# Patient Record
Sex: MALE | Race: WHITE | NOT HISPANIC OR LATINO | URBAN - NONMETROPOLITAN AREA
[De-identification: names, ages, dates, MRNs, and addresses within clinical notes are randomized per-mention and may not be internally consistent; named-entity substitution may affect disease eponyms.]

---

## 2017-03-16 ENCOUNTER — OFFICE VISIT (OUTPATIENT)
Dept: INTERNAL MEDICINE | Facility: CLINIC | Age: 52
End: 2017-03-16

## 2017-03-16 ENCOUNTER — HOSPITAL ENCOUNTER (OUTPATIENT)
Dept: GENERAL RADIOLOGY | Facility: HOSPITAL | Age: 52
Discharge: HOME | End: 2017-03-16
Attending: FAMILY MEDICINE | Admitting: FAMILY MEDICINE

## 2017-03-16 VITALS
HEIGHT: 71 IN | BODY MASS INDEX: 26.99 KG/M2 | TEMPERATURE: 97.9 F | OXYGEN SATURATION: 98 % | HEART RATE: 78 BPM | WEIGHT: 192.8 LBS | SYSTOLIC BLOOD PRESSURE: 108 MMHG | DIASTOLIC BLOOD PRESSURE: 78 MMHG

## 2017-03-16 DIAGNOSIS — M25.521 RIGHT ELBOW PAIN: ICD-10-CM

## 2017-03-16 DIAGNOSIS — N52.9 VASCULOGENIC ERECTILE DYSFUNCTION, UNSPECIFIED VASCULOGENIC ERECTILE DYSFUNCTION TYPE: ICD-10-CM

## 2017-03-16 DIAGNOSIS — R10.13 EPIGASTRIC ABDOMINAL PAIN: ICD-10-CM

## 2017-03-16 DIAGNOSIS — Z11.59 NEED FOR HEPATITIS C SCREENING TEST: ICD-10-CM

## 2017-03-16 DIAGNOSIS — M25.562 CHRONIC PAIN OF BOTH KNEES: Primary | ICD-10-CM

## 2017-03-16 DIAGNOSIS — M79.671 PAIN OF RIGHT HEEL: ICD-10-CM

## 2017-03-16 DIAGNOSIS — F32.1 MODERATE SINGLE CURRENT EPISODE OF MAJOR DEPRESSIVE DISORDER (HCC): ICD-10-CM

## 2017-03-16 DIAGNOSIS — M25.562 CHRONIC PAIN OF BOTH KNEES: ICD-10-CM

## 2017-03-16 DIAGNOSIS — G89.29 CHRONIC PAIN OF BOTH KNEES: Primary | ICD-10-CM

## 2017-03-16 DIAGNOSIS — G89.29 CHRONIC PAIN OF BOTH KNEES: ICD-10-CM

## 2017-03-16 DIAGNOSIS — M25.561 CHRONIC PAIN OF BOTH KNEES: Primary | ICD-10-CM

## 2017-03-16 DIAGNOSIS — M25.561 CHRONIC PAIN OF BOTH KNEES: ICD-10-CM

## 2017-03-16 DIAGNOSIS — R20.0 NUMBNESS AND TINGLING OF BOTH LEGS: ICD-10-CM

## 2017-03-16 DIAGNOSIS — R20.2 NUMBNESS AND TINGLING OF BOTH LEGS: ICD-10-CM

## 2017-03-16 LAB
ALBUMIN SERPL-MCNC: 4.2 G/DL (ref 3.2–4.8)
ALBUMIN/GLOB SERPL: 1.6 G/DL (ref 1.5–2.5)
ALP SERPL-CCNC: 81 U/L (ref 25–100)
ALT SERPL W P-5'-P-CCNC: 18 U/L (ref 7–40)
ANION GAP SERPL CALCULATED.3IONS-SCNC: 8 MMOL/L (ref 3–11)
ARTICHOKE IGE QN: 125 MG/DL (ref 0–130)
AST SERPL-CCNC: 23 U/L (ref 0–33)
BASOPHILS # BLD AUTO: 0.02 10*3/MM3 (ref 0–0.2)
BASOPHILS NFR BLD AUTO: 0.2 % (ref 0–1)
BILIRUB SERPL-MCNC: 0.5 MG/DL (ref 0.3–1.2)
BUN BLD-MCNC: 11 MG/DL (ref 9–23)
BUN/CREAT SERPL: 11 (ref 7–25)
CALCIUM SPEC-SCNC: 9.9 MG/DL (ref 8.7–10.4)
CHLORIDE SERPL-SCNC: 105 MMOL/L (ref 99–109)
CHOLEST SERPL-MCNC: 208 MG/DL (ref 0–200)
CO2 SERPL-SCNC: 28 MMOL/L (ref 20–31)
CREAT BLD-MCNC: 1 MG/DL (ref 0.6–1.3)
DEPRECATED RDW RBC AUTO: 47.6 FL (ref 37–54)
EOSINOPHIL # BLD AUTO: 0.23 10*3/MM3 (ref 0.1–0.3)
EOSINOPHIL NFR BLD AUTO: 2.7 % (ref 0–3)
ERYTHROCYTE [DISTWIDTH] IN BLOOD BY AUTOMATED COUNT: 14 % (ref 11.3–14.5)
FOLATE SERPL-MCNC: 5.97 NG/ML (ref 3.2–20)
GFR SERPL CREATININE-BSD FRML MDRD: 78 ML/MIN/1.73
GLOBULIN UR ELPH-MCNC: 2.7 GM/DL
GLUCOSE BLD-MCNC: 78 MG/DL (ref 70–100)
HCT VFR BLD AUTO: 52.2 % (ref 38.9–50.9)
HCV AB SER DONR QL: NORMAL
HDLC SERPL-MCNC: 39 MG/DL (ref 40–60)
HGB BLD-MCNC: 17.3 G/DL (ref 13.1–17.5)
IMM GRANULOCYTES # BLD: 0.01 10*3/MM3 (ref 0–0.03)
IMM GRANULOCYTES NFR BLD: 0.1 % (ref 0–0.6)
LYMPHOCYTES # BLD AUTO: 1.85 10*3/MM3 (ref 0.6–4.8)
LYMPHOCYTES NFR BLD AUTO: 21.8 % (ref 24–44)
MCH RBC QN AUTO: 30.7 PG (ref 27–31)
MCHC RBC AUTO-ENTMCNC: 33.1 G/DL (ref 32–36)
MCV RBC AUTO: 92.6 FL (ref 80–99)
MONOCYTES # BLD AUTO: 0.44 10*3/MM3 (ref 0–1)
MONOCYTES NFR BLD AUTO: 5.2 % (ref 0–12)
NEUTROPHILS # BLD AUTO: 5.94 10*3/MM3 (ref 1.5–8.3)
NEUTROPHILS NFR BLD AUTO: 70 % (ref 41–71)
PLATELET # BLD AUTO: 111 10*3/MM3 (ref 150–450)
PMV BLD AUTO: 11.8 FL (ref 6–12)
POTASSIUM BLD-SCNC: 4.1 MMOL/L (ref 3.5–5.5)
PROT SERPL-MCNC: 6.9 G/DL (ref 5.7–8.2)
RBC # BLD AUTO: 5.64 10*6/MM3 (ref 4.2–5.76)
SODIUM BLD-SCNC: 141 MMOL/L (ref 132–146)
TRIGL SERPL-MCNC: 424 MG/DL (ref 0–150)
VIT B12 BLD-MCNC: 286 PG/ML (ref 211–911)
WBC NRBC COR # BLD: 8.49 10*3/MM3 (ref 3.5–10.8)

## 2017-03-16 PROCEDURE — 85025 COMPLETE CBC W/AUTO DIFF WBC: CPT | Performed by: FAMILY MEDICINE

## 2017-03-16 PROCEDURE — 80053 COMPREHEN METABOLIC PANEL: CPT | Performed by: FAMILY MEDICINE

## 2017-03-16 PROCEDURE — 83516 IMMUNOASSAY NONANTIBODY: CPT | Performed by: FAMILY MEDICINE

## 2017-03-16 PROCEDURE — 36415 COLL VENOUS BLD VENIPUNCTURE: CPT | Performed by: FAMILY MEDICINE

## 2017-03-16 PROCEDURE — 73562 X-RAY EXAM OF KNEE 3: CPT

## 2017-03-16 PROCEDURE — 96127 BRIEF EMOTIONAL/BEHAV ASSMT: CPT | Performed by: FAMILY MEDICINE

## 2017-03-16 PROCEDURE — 82746 ASSAY OF FOLIC ACID SERUM: CPT | Performed by: FAMILY MEDICINE

## 2017-03-16 PROCEDURE — 80061 LIPID PANEL: CPT | Performed by: FAMILY MEDICINE

## 2017-03-16 PROCEDURE — 99204 OFFICE O/P NEW MOD 45 MIN: CPT | Performed by: FAMILY MEDICINE

## 2017-03-16 PROCEDURE — 82607 VITAMIN B-12: CPT | Performed by: FAMILY MEDICINE

## 2017-03-16 PROCEDURE — 86803 HEPATITIS C AB TEST: CPT | Performed by: FAMILY MEDICINE

## 2017-03-16 RX ORDER — SILDENAFIL 25 MG/1
25 TABLET, FILM COATED ORAL DAILY PRN
Qty: 15 TABLET | Refills: 2 | Status: SHIPPED | OUTPATIENT
Start: 2017-03-16 | End: 2017-11-01

## 2017-03-16 RX ORDER — METHYLPREDNISOLONE 4 MG/1
TABLET ORAL
Qty: 1 EACH | Refills: 0 | Status: SHIPPED | OUTPATIENT
Start: 2017-03-16 | End: 2017-11-01

## 2017-03-16 RX ORDER — MELOXICAM 15 MG/1
15 TABLET ORAL DAILY
Qty: 30 TABLET | Refills: 2 | Status: SHIPPED | OUTPATIENT
Start: 2017-03-16 | End: 2017-11-01

## 2017-03-16 NOTE — PROGRESS NOTES
"Subjective   Jaxon High is a 51 y.o. male.     History of Present Illness   Elbow pain for a couple of months that's worsening. Right pain. He's tried not using it but it has not helped.  No known injuries.    Also having knee pain. Was told he needed a replacement. If he stands on feet too long he feels a needle sensation in his thighs.  A family member took hydrocodone and seemed to help with their joint pain.    Has pain in right heel. Tender and hurts to stand on. Squeezes and it hurts. No pain in rest of foot. No injury. Has been there 1-2 months at least. Mom had similar symptoms. No pain on left.  Pain does not improve with continued walking.    Had cholecystectomy in 2009. Has occasional pain on epigastrum, feels like a knife twisting. Relieved by passing gas or burping. Does not get a lot of heartburn, used to. Wakes up with bad taste in mouth. Pain is worse when he drinks liquor, has \"crying pain.\"     Has had a 40% lung collapse on right from fluid build up, not given diagnosis for that. Happened around 2010.     Also having difficulty with mood.  No suicidal thoughts at this time.  Does not feel medication is needed.  He feels most of his mood issues are due to his chronic pain.    PHQ-9 Depression Screening 3/16/2017   Little interest or pleasure in doing things 2   Feeling down, depressed, or hopeless 2   Trouble falling or staying asleep, or sleeping too much 2   Feeling tired or having little energy 2   Poor appetite or overeating 2   Feeling bad about yourself - or that you are a failure or have let yourself or your family down 3   Trouble concentrating on things, such as reading the newspaper or watching television 2   Moving or speaking so slowly that other people could have noticed. Or the opposite - being so fidgety or restless that you have been moving around a lot more than usual 2   Thoughts that you would be better off dead, or of hurting yourself in some way 2   PHQ-9 Total Score 19   If " you checked off any problems, how difficult have these problems made it for you to do your work, take care of things at home, or get along with other people? Very difficult       The following portions of the patient's history were reviewed and updated as appropriate: allergies, current medications, past family history, past medical history, past social history, past surgical history and problem list.    Review of Systems   Constitutional:        Feeling poorly, tired, weight gain.   Respiratory:        Wheezing, cough, shortness of breath with exertion.   Cardiovascular: Negative for chest pain.        Leg cramps   Genitourinary: Negative for difficulty urinating, frequency and urgency.        No nocturia   Musculoskeletal: Positive for arthralgias and myalgias.        Chronic pain   Neurological:        Headache, numbness, tingling   Psychiatric/Behavioral: Positive for dysphoric mood. Negative for suicidal ideas.        Sadness   All other systems reviewed and are negative.      Objective   Physical Exam   Constitutional: He is oriented to person, place, and time. Vital signs are normal. He appears well-developed and well-nourished. He is active. He does not appear ill.   HENT:   Head: Normocephalic and atraumatic. Hair is normal.   Right Ear: Hearing normal.   Left Ear: Hearing normal.   Nose: Nose normal.   Mouth/Throat: Mucous membranes are not dry. Abnormal dentition.   Eyes: EOM and lids are normal. Pupils are equal, round, and reactive to light. No scleral icterus.   Neck: Phonation normal.   Cardiovascular: Normal rate, regular rhythm and normal heart sounds.    Pulmonary/Chest: Effort normal and breath sounds normal.   Abdominal: Soft. Bowel sounds are normal. He exhibits no distension, no fluid wave and no mass. There is no hepatosplenomegaly. There is tenderness in the epigastric area.   Musculoskeletal:   Severe crepitus to knees bilaterally. Negative for lateral or medial joint line tenderness.  Tenderness along lateral epicondyle on right elbow. Normal flexion/extension elbow.    Neurological: He is alert and oriented to person, place, and time. He displays no tremor. No cranial nerve deficit. Gait normal.   Skin: Skin is warm. He is not diaphoretic. No cyanosis. Nails show no clubbing.   Psychiatric: He has a normal mood and affect. His speech is normal and behavior is normal. Judgment and thought content normal.   Nursing note and vitals reviewed.    X-ray from today's visit reviewed that read is pending.  I do not appreciate any joint pathology.  No bone lesions noted.    Assessment/Plan   Jaxon was seen today for establish care, knee pain, abdominal pain and foot pain.    Diagnoses and all orders for this visit:    Chronic pain of both knees  -     XR Knee 3 View Bilateral; Future  -     meloxicam (MOBIC) 15 MG tablet; Take 1 tablet by mouth Daily.  -     Celiac Comprehensive Panel  -     Helicobacter Pylori, IgA IgG IgM    Epigastric abdominal pain  -     CBC & Differential; Future  -     Comprehensive Metabolic Panel; Future  -     Lipid Panel; Future  -     Cancel: Celiac Comprehensive Panel  -     Cancel: Helicobacter Pylori, IgA IgG IgM  -     Celiac Comprehensive Panel  -     Helicobacter Pylori, IgA IgG IgM  -     CBC & Differential  -     Comprehensive Metabolic Panel  -     Lipid Panel  -     CBC Auto Differential    Numbness and tingling of both legs  -     Vitamin B12 & Folate; Future  -     Celiac Comprehensive Panel  -     Helicobacter Pylori, IgA IgG IgM  -     Vitamin B12 & Folate    Pain of right heel  -     meloxicam (MOBIC) 15 MG tablet; Take 1 tablet by mouth Daily.  -     Celiac Comprehensive Panel  -     Helicobacter Pylori, IgA IgG IgM    Right elbow pain  -     meloxicam (MOBIC) 15 MG tablet; Take 1 tablet by mouth Daily.  -     MethylPREDNISolone (MEDROL, ISABELLA,) 4 MG tablet; Take as directed on package instructions.  -     Celiac Comprehensive Panel  -     Helicobacter Pylori,  IgA IgG IgM    Vasculogenic erectile dysfunction, unspecified vasculogenic erectile dysfunction type  -     sildenafil (VIAGRA) 25 MG tablet; Take 1 tablet by mouth Daily As Needed for erectile dysfunction.    Moderate single current episode of major depressive disorder    Need for hepatitis C screening test  -     Hepatitis C Antibody; Future  -     Celiac Comprehensive Panel  -     Helicobacter Pylori, IgA IgG IgM  -     Hepatitis C Antibody     need past records.  Patient in today with a lot of pain complaints.  Encouraged use of anti-inflammatory's to help with this to get at the root of pain.  Discouraged use of narcotics.  Mentioned possible meralgia paresthetica but patient wears loosefitting pants.  I have asked him to return in about a month to follow-up on his many issues.  We'll recheck mood at that time.  Told patient that if he gets an erection last more than 4 hours he needs to go to the ER.

## 2017-03-17 LAB
ENDOMYSIUM IGA SER QL: NEGATIVE
GLIADIN PEPTIDE IGA SER-ACNC: 7 UNITS (ref 0–19)
GLIADIN PEPTIDE IGG SER-ACNC: 2 UNITS (ref 0–19)
IGA SERPL-MCNC: 206 MG/DL (ref 90–386)
TTG IGA SER-ACNC: <2 U/ML (ref 0–3)
TTG IGG SER-ACNC: 4 U/ML (ref 0–5)

## 2017-03-20 LAB
H PYLORI IGA SER IA-ACNC: 9.5 UNITS (ref 0–8.9)
H PYLORI IGG SER IA-ACNC: 5.6 U/ML (ref 0–0.8)
H PYLORI IGM SER-ACNC: <9 UNITS (ref 0–8.9)

## 2017-03-20 RX ORDER — AMOXICILLIN 500 MG/1
1000 CAPSULE ORAL 2 TIMES DAILY
Qty: 56 CAPSULE | Refills: 0 | Status: SHIPPED | OUTPATIENT
Start: 2017-03-20 | End: 2017-04-03

## 2017-03-20 RX ORDER — OMEPRAZOLE 20 MG/1
20 CAPSULE, DELAYED RELEASE ORAL 2 TIMES DAILY
Qty: 28 CAPSULE | Refills: 0 | Status: SHIPPED | OUTPATIENT
Start: 2017-03-20 | End: 2017-04-03

## 2017-03-20 RX ORDER — CLARITHROMYCIN 500 MG/1
500 TABLET, COATED ORAL 2 TIMES DAILY
Qty: 28 TABLET | Refills: 0 | Status: SHIPPED | OUTPATIENT
Start: 2017-03-20 | End: 2017-11-01

## 2017-03-22 ENCOUNTER — TELEPHONE (OUTPATIENT)
Dept: INTERNAL MEDICINE | Facility: CLINIC | Age: 52
End: 2017-03-22

## 2017-03-22 NOTE — TELEPHONE ENCOUNTER
MR. PATEL STOPPED IN THE OFFICE TO GET SAMPLES OF VIAGRA. WE ARE OUT OF SAMPLES. I ASK HIM IF HE WAS ABLE TO GET HIS MEDICATION FROM THE PHARMACY, HE SAID THAT INSURANCE WILL NOT PAY AND EACH PILL COST $59/EACH. I TOLD HIM I WOULD TRY TO SAVE HIM SOME SAMPLES WHEN WE GET MORE.

## 2017-11-01 ENCOUNTER — OFFICE VISIT (OUTPATIENT)
Dept: INTERNAL MEDICINE | Facility: CLINIC | Age: 52
End: 2017-11-01

## 2017-11-01 VITALS
RESPIRATION RATE: 14 BRPM | DIASTOLIC BLOOD PRESSURE: 72 MMHG | OXYGEN SATURATION: 96 % | TEMPERATURE: 98.2 F | SYSTOLIC BLOOD PRESSURE: 120 MMHG | WEIGHT: 198 LBS | HEART RATE: 82 BPM | HEIGHT: 71 IN | BODY MASS INDEX: 27.72 KG/M2

## 2017-11-01 DIAGNOSIS — R11.10 INTERMITTENT VOMITING: ICD-10-CM

## 2017-11-01 DIAGNOSIS — M25.521 RIGHT ELBOW PAIN: ICD-10-CM

## 2017-11-01 DIAGNOSIS — R10.13 EPIGASTRIC ABDOMINAL PAIN: ICD-10-CM

## 2017-11-01 DIAGNOSIS — M54.2 NECK PAIN: Primary | ICD-10-CM

## 2017-11-01 PROCEDURE — 99214 OFFICE O/P EST MOD 30 MIN: CPT | Performed by: FAMILY MEDICINE

## 2017-11-01 RX ORDER — OMEPRAZOLE 20 MG/1
20 CAPSULE, DELAYED RELEASE ORAL DAILY
Qty: 30 CAPSULE | Refills: 1 | Status: SHIPPED | OUTPATIENT
Start: 2017-11-01 | End: 2018-03-07 | Stop reason: SDUPTHER

## 2017-11-01 RX ORDER — TIZANIDINE HYDROCHLORIDE 4 MG/1
4 CAPSULE, GELATIN COATED ORAL 3 TIMES DAILY PRN
Qty: 90 CAPSULE | Refills: 1 | Status: SHIPPED | OUTPATIENT
Start: 2017-11-01

## 2017-11-01 NOTE — PROGRESS NOTES
Subjective    Jaxon High is a 52 y.o. male here for:  Chief Complaint   Patient presents with   • Heartburn     after eating   • Pain     pain in back of head radiates down into neck   • Elbow Problem     inflamation in elbow     History of Present Illness   Patient says he can eat and about 5 min after he's burping everything up. He says food is coming up with hot liquid. He's had this for about a week. No constipation at this time. No black color to bowel movement nor blood. He's not had this issue before. No heartburn prior to this. Had gallbladder out a couple of years ago. Not eating any different food. He has some epigastric ab pain as well as rlq ab pain.     He is again having right elbow pain. He previously had an injection in the elbow that helped. It's the same thing as last time.     Has a constant pain on back of head, 24 hours. If he turns his head pain shoots from shoulder up neck to head. He's had this for a week or week and a half. Seemed to start around same time as reflux issued. One time before he had pain in head and he got a shot in the hospital. May have been tension, could not move head and had shoulder pain. No vision changes. Almost feels like he's buzzed like drinking but he's not drinking. No injuries.     The following portions of the patient's history were reviewed and updated as appropriate: allergies, current medications, past family history, past medical history, past social history, past surgical history and problem list.    Review of Systems   Constitutional: Negative for activity change.   Gastrointestinal: Positive for abdominal pain. Negative for anal bleeding and constipation.   Musculoskeletal: Positive for arthralgias.   Neurological: Positive for headaches. Negative for syncope.       Vitals:    11/01/17 1447   BP: 120/72   Pulse: 82   Resp: 14   Temp: 98.2 °F (36.8 °C)   SpO2: 96%       Objective   Physical Exam   Constitutional: He is oriented to person, place, and  time. Vital signs are normal. He appears well-developed and well-nourished. He is active. He does not have a sickly appearance. He does not appear ill.   Appears stated age. Well groomed. overweight.   HENT:   Head: Normocephalic and atraumatic. Head is without contusion. Hair is normal.   Right Ear: Hearing normal.   Left Ear: Hearing normal.   Nose: Nose normal.   Mouth/Throat: Mucous membranes are not dry.   Eyes: EOM and lids are normal. Pupils are equal, round, and reactive to light. No scleral icterus.   Neck: Normal range of motion. Neck supple. Muscular tenderness present. No rigidity.   Cardiovascular: Normal rate, regular rhythm and normal heart sounds.  Exam reveals no gallop and no friction rub.    No murmur heard.  Pulmonary/Chest: Effort normal and breath sounds normal.   Abdominal: Soft. Bowel sounds are normal. He exhibits no distension. There is no hepatosplenomegaly. There is tenderness in the epigastric area. There is no rigidity, no rebound and no guarding.   Musculoskeletal: He exhibits no deformity.        Right elbow: He exhibits normal range of motion, no swelling, no effusion, no deformity and no laceration.        Cervical back: He exhibits no deformity.        Right upper arm: He exhibits no tenderness, no swelling and no deformity.        Right forearm: He exhibits no tenderness, no swelling and no deformity.   Neurological: He is alert and oriented to person, place, and time. He displays no tremor. No cranial nerve deficit. Gait normal.   Skin: Skin is warm. He is not diaphoretic. No cyanosis. Nails show no clubbing.   Psychiatric: He has a normal mood and affect. His speech is normal and behavior is normal. Judgment and thought content normal. Cognition and memory are normal.   Nursing note and vitals reviewed.      Assessment/Plan   Jaxon was seen today for heartburn, pain and elbow problem.    Diagnoses and all orders for this visit:    Neck pain  -     TiZANidine (ZANAFLEX) 4 MG  capsule; Take 1 capsule by mouth 3 (Three) Times a Day As Needed for Muscle Spasms.    Epigastric abdominal pain  -     omeprazole (priLOSEC) 20 MG capsule; Take 1 capsule by mouth Daily.    Intermittent vomiting  -     omeprazole (priLOSEC) 20 MG capsule; Take 1 capsule by mouth Daily.    Right elbow pain      · Trial of PPI. Discussed use, use for 2 weeks then stop and see if issue returns. May need EGD  · Suspect muscle pain leading to headache he's describing. No red flags to warrant head imaging at this time but may if symptoms do not improve  · Needs to call ortho for elbow. Has been seen within year. Phone number provided.            May Merino MD

## 2017-11-22 ENCOUNTER — OFFICE VISIT (OUTPATIENT)
Dept: INTERNAL MEDICINE | Facility: CLINIC | Age: 52
End: 2017-11-22

## 2017-11-22 VITALS
OXYGEN SATURATION: 95 % | HEART RATE: 80 BPM | BODY MASS INDEX: 27.72 KG/M2 | DIASTOLIC BLOOD PRESSURE: 62 MMHG | HEIGHT: 71 IN | TEMPERATURE: 97.6 F | WEIGHT: 198 LBS | SYSTOLIC BLOOD PRESSURE: 118 MMHG | RESPIRATION RATE: 12 BRPM

## 2017-11-22 DIAGNOSIS — Z20.2 HPV EXPOSURE: Primary | ICD-10-CM

## 2017-11-22 PROCEDURE — 99212 OFFICE O/P EST SF 10 MIN: CPT | Performed by: FAMILY MEDICINE

## 2017-11-23 NOTE — PROGRESS NOTES
Subjective    Jaxon High is a 52 y.o. male here for:  Chief Complaint   Patient presents with   • Follow-up     Wife recently diagnosed with HPV, requesting labs     History of Present Illness     Wife recently had pap smear that was normal but hpv 18 positive. Patient wants to be screened for hpv.     The following portions of the patient's history were reviewed and updated as appropriate: allergies, current medications, past family history, past medical history, past social history, past surgical history and problem list.    Review of Systems   Constitutional: Negative for activity change.       Vitals:    11/22/17 1519   BP: 118/62   Pulse: 80   Resp: 12   Temp: 97.6 °F (36.4 °C)   SpO2: 95%       Objective   Physical Exam   Constitutional: He is oriented to person, place, and time. Vital signs are normal. He appears well-developed and well-nourished.  Non-toxic appearance. He does not appear ill. No distress.   Appears stated age. Well groomed.   HENT:   Head: Normocephalic and atraumatic.   Eyes: EOM are normal. No scleral icterus.   Pulmonary/Chest: Effort normal.   Neurological: He is alert and oriented to person, place, and time.   Skin: Skin is warm. He is not diaphoretic.   Psychiatric: He has a normal mood and affect. His speech is normal and behavior is normal. Cognition and memory are normal.   Nursing note and vitals reviewed.      Assessment/Plan   Jaxon was seen today for follow-up.    Diagnoses and all orders for this visit:    HPV exposure      · Information provided from CDC. No screenings recommended for men. Suggested smoking cessation.         May Merino MD

## 2018-02-08 ENCOUNTER — OFFICE VISIT (OUTPATIENT)
Dept: INTERNAL MEDICINE | Facility: CLINIC | Age: 53
End: 2018-02-08

## 2018-02-08 VITALS
RESPIRATION RATE: 16 BRPM | OXYGEN SATURATION: 97 % | BODY MASS INDEX: 28.45 KG/M2 | DIASTOLIC BLOOD PRESSURE: 72 MMHG | HEART RATE: 72 BPM | HEIGHT: 71 IN | TEMPERATURE: 97.6 F | SYSTOLIC BLOOD PRESSURE: 106 MMHG | WEIGHT: 203.19 LBS

## 2018-02-08 DIAGNOSIS — R05.8 COUGH PRESENT FOR GREATER THAN 3 WEEKS: Primary | ICD-10-CM

## 2018-02-08 DIAGNOSIS — R06.2 WHEEZING: ICD-10-CM

## 2018-02-08 DIAGNOSIS — M72.2 PLANTAR FASCIITIS, LEFT: ICD-10-CM

## 2018-02-08 DIAGNOSIS — Z72.0 TOBACCO ABUSE: ICD-10-CM

## 2018-02-08 PROCEDURE — 99214 OFFICE O/P EST MOD 30 MIN: CPT | Performed by: NURSE PRACTITIONER

## 2018-02-08 PROCEDURE — 99406 BEHAV CHNG SMOKING 3-10 MIN: CPT | Performed by: NURSE PRACTITIONER

## 2018-02-08 RX ORDER — NICOTINE 21 MG/24HR
1 PATCH, TRANSDERMAL 24 HOURS TRANSDERMAL EVERY 24 HOURS
Qty: 21 PATCH | Refills: 1 | Status: SHIPPED | OUTPATIENT
Start: 2018-02-08 | End: 2018-03-07

## 2018-02-08 RX ORDER — AZELASTINE 1 MG/ML
2 SPRAY, METERED NASAL DAILY
Qty: 30 ML | Refills: 2 | Status: SHIPPED | OUTPATIENT
Start: 2018-02-08

## 2018-02-08 RX ORDER — ALBUTEROL SULFATE 90 UG/1
2 AEROSOL, METERED RESPIRATORY (INHALATION) EVERY 4 HOURS PRN
Qty: 1 INHALER | Refills: 4 | Status: SHIPPED | OUTPATIENT
Start: 2018-02-08

## 2018-02-08 NOTE — PROGRESS NOTES
Chief Complaint / Reason:      Chief Complaint   Patient presents with   • Foot Pain     left heel pain   • Cough     voice sounds different, onset about 2 mos. ago, congested.        Subjective     HPI  Patient presents today with complaints of cough onset about 2 months ago and states that he has been congested and his voice sounds different.  He is concerned as he has a family history of throat cancer.  He isn't every day smoker and states that he did smoke a pack per day and cut down to a half a pack and then to 8 cigarettes and has only had 4 cigarettes today.  He stated he would like to quit completely but it is very difficult.  He also drinks whiskey every 2-3 days he states he only has 1 shot of Yonatan Bhavesh.  Patient denies chest pain shortness of breath or heart palpitations.  He states that he does cough frequently and has to clear his throat.  His cough is not always productive nor has he had a fever or chills.  Patient does work daily and does a lot of standing for 11 hours a day and complaint of left heel pain.  He states he is left-hand dominant.  He puts more weight on his left foot and he stands on concrete floors.  He states it feels like something is being stabbed into his heel.  Denies numbness or tingling.  He states this is been going on for over a month now and has worsened.  He states symptoms are worse in the morning and evening.  History taken from: patient    PMH/FH/Social History were reviewed and updated appropriately in the electronic medical record.     Review of Systems:   Review of Systems   Constitutional: Negative.    HENT: Positive for congestion.    Respiratory: Positive for cough and wheezing.    Cardiovascular: Negative.    Gastrointestinal: Negative.    Musculoskeletal: Positive for arthralgias, gait problem and myalgias.     All other systems were reviewed and are negative.  Exceptions are noted in the subjective or above.      Objective     Vital Signs  Vitals:     02/08/18 1442   BP: 106/72   Pulse: 72   Resp: 16   Temp: 97.6 °F (36.4 °C)   SpO2: 97%       Body mass index is 28.34 kg/(m^2).    Physical Exam   Constitutional: He is oriented to person, place, and time. He appears well-developed and well-nourished.   Cardiovascular: Normal rate, regular rhythm, normal heart sounds and intact distal pulses.    Pulmonary/Chest: Effort normal. He has wheezes. He exhibits no tenderness.   Abdominal: Soft. Bowel sounds are normal.   Musculoskeletal: Normal range of motion. He exhibits tenderness.        Left foot: There is bony tenderness. There is normal range of motion and normal capillary refill.   Patient's arch is less when standing and puts more weight on left foot.Shoes appear to be worn more on heel region of shoe.         Neurological: He is alert and oriented to person, place, and time.   Skin: Skin is warm and dry.   Psychiatric: He has a normal mood and affect. His behavior is normal. Judgment and thought content normal.   Nursing note and vitals reviewed.        Medication Review:     Current Outpatient Prescriptions:   •  omeprazole (priLOSEC) 20 MG capsule, Take 1 capsule by mouth Daily., Disp: 30 capsule, Rfl: 1  •  TiZANidine (ZANAFLEX) 4 MG capsule, Take 1 capsule by mouth 3 (Three) Times a Day As Needed for Muscle Spasms., Disp: 90 capsule, Rfl: 1  •  albuterol (PROVENTIL HFA;VENTOLIN HFA) 108 (90 Base) MCG/ACT inhaler, Inhale 2 puffs Every 4 (Four) Hours As Needed for Wheezing or Shortness of Air., Disp: 1 inhaler, Rfl: 4  •  azelastine (ASTELIN) 0.1 % nasal spray, 2 sprays into each nostril Daily. Use in each nostril as directed, Disp: 30 mL, Rfl: 2  •  nicotine (NICODERM CQ) 14 MG/24HR patch, Place 1 patch on the skin Daily., Disp: 21 patch, Rfl: 1    Assessment/Plan   Jaxon was seen today for foot pain and cough.    Diagnoses and all orders for this visit:    Cough present for greater than 3 weeks  -     azelastine (ASTELIN) 0.1 % nasal spray; 2 sprays into  each nostril Daily. Use in each nostril as directed  Increase Water intake, elevate head of bed at night, and smoking cessation.  Advised patient to take over-the-counter Zyrtec  If symptoms persist recommend chest x-ray PA and lateral  Wheezing  -     albuterol (PROVENTIL HFA;VENTOLIN HFA) 108 (90 Base) MCG/ACT inhaler; Inhale 2 puffs Every 4 (Four) Hours As Needed for Wheezing or Shortness of Air.  Recommend coughing and deep breathing along with pulmonary hygiene.  Tobacco abuse  -     nicotine (NICODERM CQ) 14 MG/24HR patch; Place 1 patch on the skin Daily.  I advised the patient of the risks in continuing to use tobacco, and I provided this patient with smoking cessation educational materials.  I also discussed how to quit smoking and the patient has expressed the willingness to quit.      During this visit, I spent 3-10 mintues counseling the patient regarding smoking cessation.       Plantar fasciitis, left  Recommend patient avoid sitting or standing for long periods of time.  Recommend night splint and exercises to stretch. .  Advised patient to rest ice and over-the-counter pain medicines to manage pain.  Recommend patient use tennis ball or water bottle and roll under good.  Recommend he follow up if symptoms do not improve.  May need imaging to rule out heel spur.   Advised patient to get comfortable shoes and possible heel inserts     Return in about 4 weeks (around 3/8/2018), or if symptoms worsen or fail to improve.    Anayeli Neal, APRN  02/08/2018

## 2018-03-07 ENCOUNTER — OFFICE VISIT (OUTPATIENT)
Dept: INTERNAL MEDICINE | Facility: CLINIC | Age: 53
End: 2018-03-07

## 2018-03-07 VITALS — SYSTOLIC BLOOD PRESSURE: 124 MMHG | DIASTOLIC BLOOD PRESSURE: 84 MMHG

## 2018-03-07 DIAGNOSIS — R49.9 CHANGE OF VOICE: Primary | ICD-10-CM

## 2018-03-07 DIAGNOSIS — Z72.0 TOBACCO ABUSE: ICD-10-CM

## 2018-03-07 DIAGNOSIS — Z80.0 FAMILY HISTORY OF THROAT CANCER: ICD-10-CM

## 2018-03-07 PROCEDURE — 99213 OFFICE O/P EST LOW 20 MIN: CPT | Performed by: FAMILY MEDICINE

## 2018-03-09 NOTE — PROGRESS NOTES
Subjective    Jaxon High is a 52 y.o. male here for:  No chief complaint on file.    History of Present Illness     He was seen 2/8/18 by TRELL Neal for cough, wheezing, and voice changes. He reports cough has improved but despite Rx for Astelin voice still does not sound right. He smokes and there's a family history of cancer in the throats. He is interested in further evaluation by ENT.    The following portions of the patient's history were reviewed and updated as appropriate: allergies, current medications, past family history, past medical history, past social history, past surgical history and problem list.    Review of Systems   Constitutional: Negative for appetite change.   Respiratory: Negative for shortness of breath.        Vitals:    03/07/18 1121   BP: 124/84     (vitals were taken when patient was on Anayeli's schedule by mistake, I've reviewed them).      Objective   Physical Exam   Constitutional: He is oriented to person, place, and time. Vital signs are normal. He appears well-developed and well-nourished.  Non-toxic appearance. He does not appear ill. No distress.   Appears stated age. Well groomed.   HENT:   Head: Normocephalic and atraumatic.   Eyes: EOM are normal. No scleral icterus.   Neck:   A bit hoarse in voice   Pulmonary/Chest: Effort normal.   Neurological: He is alert and oriented to person, place, and time.   Skin: Skin is warm. He is not diaphoretic.   Psychiatric: He has a normal mood and affect. His speech is normal and behavior is normal. Cognition and memory are normal.   Nursing note and vitals reviewed.          Assessment/Plan       Diagnoses and all orders for this visit:    Change of voice  -     Ambulatory Referral to ENT (Otolaryngology)    Tobacco abuse  -     Ambulatory Referral to ENT (Otolaryngology)    Family history of throat cancer  -     Ambulatory Referral to ENT (Otolaryngology)        ·     Return for As Needed.    May Merino MD    Please note  that portions of this note may have been completed with a voice recognition program. Efforts were made to edit dictation, but occasionally words are mistranscribed.

## 2018-08-27 ENCOUNTER — OFFICE VISIT (OUTPATIENT)
Dept: INTERNAL MEDICINE | Facility: CLINIC | Age: 53
End: 2018-08-27

## 2018-08-27 VITALS
WEIGHT: 199.75 LBS | BODY MASS INDEX: 27.97 KG/M2 | RESPIRATION RATE: 16 BRPM | OXYGEN SATURATION: 94 % | HEART RATE: 76 BPM | TEMPERATURE: 98.9 F | SYSTOLIC BLOOD PRESSURE: 112 MMHG | DIASTOLIC BLOOD PRESSURE: 74 MMHG | HEIGHT: 71 IN

## 2018-08-27 DIAGNOSIS — K21.9 GASTROESOPHAGEAL REFLUX DISEASE, ESOPHAGITIS PRESENCE NOT SPECIFIED: ICD-10-CM

## 2018-08-27 DIAGNOSIS — N52.9 VASCULOGENIC ERECTILE DYSFUNCTION, UNSPECIFIED VASCULOGENIC ERECTILE DYSFUNCTION TYPE: Primary | ICD-10-CM

## 2018-08-27 DIAGNOSIS — R05.8 COUGH PRESENT FOR GREATER THAN 3 WEEKS: ICD-10-CM

## 2018-08-27 DIAGNOSIS — Z72.0 TOBACCO ABUSE: ICD-10-CM

## 2018-08-27 PROCEDURE — 99214 OFFICE O/P EST MOD 30 MIN: CPT | Performed by: NURSE PRACTITIONER

## 2018-08-27 RX ORDER — TADALAFIL 10 MG/1
10 TABLET ORAL DAILY PRN
Qty: 5 TABLET | Refills: 1 | Status: SHIPPED | OUTPATIENT
Start: 2018-08-27 | End: 2018-09-06

## 2018-08-27 RX ORDER — OMEPRAZOLE 40 MG/1
40 CAPSULE, DELAYED RELEASE ORAL DAILY
Qty: 30 CAPSULE | Refills: 1 | Status: SHIPPED | OUTPATIENT
Start: 2018-08-27

## 2018-09-04 PROBLEM — K21.9 GERD (GASTROESOPHAGEAL REFLUX DISEASE): Status: ACTIVE | Noted: 2018-09-04

## 2018-09-04 NOTE — PROGRESS NOTES
Chief Complaint / Reason:      Chief Complaint   Patient presents with   • Cough     followup, asked for Viagra samples, would like to increase omeprazole rx to 40 mg as that has been more effective.        Subjective     HPI  Patient presents today for follow-up regarding cough that has been going on for greater then a month he states that it is not as bad that he feels like the omeprazole seems to be working but he would like to increase the dosage to 40 mg as it has been more effective.  He also is requesting Viagra samples for erectile dysfunction.  Vital signs are stable.  He denies chest pain, shortness of breath or heart palpitations.  He denies any weight loss night sweats or bloody sputum. Denies problems swallowing.  Patient does have a history of tobacco usage and is a current every day smoker.  He has not had a chest x-ray.  Oxygen saturation is 94%.  Patient does have a family history of throat cancer and strongly advised patient to quit smoking and do a chest x-ray.  History taken from: patient    PMH/FH/Social History were reviewed and updated appropriately in the electronic medical record.     Review of Systems:   Review of Systems   Constitutional: Negative.    HENT: Positive for congestion.    Respiratory: Positive for cough.    Cardiovascular: Negative.    Gastrointestinal:        Heartburn   Musculoskeletal: Positive for arthralgias, gait problem and myalgias.     All other systems were reviewed and are negative.  Exceptions are noted in the subjective or above.      Objective     Vital Signs  Vitals:    08/27/18 1411   BP: 112/74   Pulse: 76   Resp: 16   Temp: 98.9 °F (37.2 °C)   SpO2: 94%       Body mass index is 27.86 kg/m².    Physical Exam   Constitutional: He is oriented to person, place, and time. Vital signs are normal. He appears well-developed and well-nourished.  Non-toxic appearance. He does not appear ill. No distress.   HENT:   Head: Normocephalic and atraumatic.   Mouth/Throat:  Mucous membranes are dry. Posterior oropharyngeal erythema present.   Hoarse voice   Eyes: EOM are normal. No scleral icterus.   Cardiovascular: Normal rate, regular rhythm, normal heart sounds and intact distal pulses.    Pulmonary/Chest: Effort normal and breath sounds normal. He exhibits no tenderness.   Abdominal: Soft. Bowel sounds are normal.   Neurological: He is alert and oriented to person, place, and time.   Skin: Skin is warm and dry. He is not diaphoretic.   Psychiatric: He has a normal mood and affect. His speech is normal and behavior is normal. Judgment and thought content normal. Cognition and memory are normal.   Nursing note and vitals reviewed.       Results Review:    I reviewed the patient's new clinical results.       Medication Review:     Current Outpatient Prescriptions:   •  albuterol (PROVENTIL HFA;VENTOLIN HFA) 108 (90 Base) MCG/ACT inhaler, Inhale 2 puffs Every 4 (Four) Hours As Needed for Wheezing or Shortness of Air., Disp: 1 inhaler, Rfl: 4  •  azelastine (ASTELIN) 0.1 % nasal spray, 2 sprays into each nostril Daily. Use in each nostril as directed, Disp: 30 mL, Rfl: 2  •  TiZANidine (ZANAFLEX) 4 MG capsule, Take 1 capsule by mouth 3 (Three) Times a Day As Needed for Muscle Spasms., Disp: 90 capsule, Rfl: 1  •  omeprazole (priLOSEC) 40 MG capsule, Take 1 capsule by mouth Daily., Disp: 30 capsule, Rfl: 1  •  tadalafil (CIALIS) 10 MG tablet, Take 1 tablet by mouth Daily As Needed for erectile dysfunction., Disp: 5 tablet, Rfl: 1    Assessment/Plan   Jaxon was seen today for cough.    Diagnoses and all orders for this visit:    Vasculogenic erectile dysfunction, unspecified vasculogenic erectile dysfunction type  -     tadalafil (CIALIS) 10 MG tablet; Take 1 tablet by mouth Daily As Needed for erectile dysfunction.  Discussed medication options with patient.  Gastroesophageal reflux disease, esophagitis presence not specified  -     omeprazole (priLOSEC) 40 MG capsule; Take 1 capsule by  mouth Daily.    Discussed dietary modifications and recommend patient decrease caffeine and tobacco use   Tobacco abuse  Strongly encouraged patient to quit smoking and discussed risk factors associated with his continuation  Cough present for greater than 3 weeks  Recommend patient treat symptomatically at this time   If symptoms persist will need to do further evaluation such as chest x-ray.      Return if symptoms worsen or fail to improve.    Anayeli Neal, APRN  08/27/2018

## 2018-09-06 ENCOUNTER — TELEPHONE (OUTPATIENT)
Dept: INTERNAL MEDICINE | Facility: CLINIC | Age: 53
End: 2018-09-06

## 2018-09-06 RX ORDER — SILDENAFIL CITRATE 20 MG/1
10 TABLET ORAL DAILY PRN
Qty: 30 TABLET | Refills: 1 | Status: SHIPPED | OUTPATIENT
Start: 2018-09-06 | End: 2019-04-25 | Stop reason: SDUPTHER

## 2018-09-06 NOTE — TELEPHONE ENCOUNTER
Pt. needs to take 1/2 tab of a 20 mg. as we could only find a suspension in the 10 mg. Tried to call pt., vm box not set up.

## 2018-09-06 NOTE — TELEPHONE ENCOUNTER
Patient called and stated that his insurance company will not cover the Cialis that you called in for him.    However, his pharmacy has advised him they will cover Sildenafil 10 mg if you will call that in for him.  He wants to know if you would call that in for him today please.    Confirmed [facility]    Confirmed pt ph # [phone]

## 2019-04-25 RX ORDER — SILDENAFIL CITRATE 20 MG/1
10 TABLET ORAL DAILY PRN
Qty: 30 TABLET | Refills: 1 | Status: SHIPPED | OUTPATIENT
Start: 2019-04-25